# Patient Record
Sex: MALE | Race: WHITE
[De-identification: names, ages, dates, MRNs, and addresses within clinical notes are randomized per-mention and may not be internally consistent; named-entity substitution may affect disease eponyms.]

---

## 2017-04-21 ENCOUNTER — HOSPITAL ENCOUNTER (OUTPATIENT)
Dept: HOSPITAL 61 - PCVCIMAG | Age: 82
Discharge: HOME | End: 2017-04-21
Attending: INTERNAL MEDICINE
Payer: MEDICARE

## 2017-04-21 DIAGNOSIS — E78.00: ICD-10-CM

## 2017-04-21 DIAGNOSIS — I25.10: ICD-10-CM

## 2017-04-21 DIAGNOSIS — I65.23: Primary | ICD-10-CM

## 2017-04-21 DIAGNOSIS — Z83.49: ICD-10-CM

## 2017-04-21 DIAGNOSIS — Z95.1: ICD-10-CM

## 2017-04-21 DIAGNOSIS — E04.1: ICD-10-CM

## 2017-04-21 PROCEDURE — G0463 HOSPITAL OUTPT CLINIC VISIT: HCPCS

## 2017-04-21 PROCEDURE — 93880 EXTRACRANIAL BILAT STUDY: CPT

## 2017-04-21 PROCEDURE — 93005 ELECTROCARDIOGRAM TRACING: CPT

## 2017-04-21 PROCEDURE — 76536 US EXAM OF HEAD AND NECK: CPT

## 2018-01-17 ENCOUNTER — HOSPITAL ENCOUNTER (OUTPATIENT)
Dept: HOSPITAL 61 - PCVCIMAG | Age: 83
Discharge: HOME | End: 2018-01-17
Attending: INTERNAL MEDICINE
Payer: MEDICARE

## 2018-01-17 DIAGNOSIS — I10: ICD-10-CM

## 2018-01-17 DIAGNOSIS — Z95.1: ICD-10-CM

## 2018-01-17 DIAGNOSIS — I25.10: Primary | ICD-10-CM

## 2018-01-17 PROCEDURE — 93351 STRESS TTE COMPLETE: CPT

## 2018-01-17 PROCEDURE — 93325 DOPPLER ECHO COLOR FLOW MAPG: CPT

## 2018-08-14 ENCOUNTER — HOSPITAL ENCOUNTER (OUTPATIENT)
Dept: HOSPITAL 61 - PCVCCLINIC | Age: 83
Discharge: HOME | End: 2018-08-14
Attending: INTERNAL MEDICINE
Payer: MEDICARE

## 2018-08-14 DIAGNOSIS — I25.810: Primary | ICD-10-CM

## 2018-08-14 DIAGNOSIS — Z87.891: ICD-10-CM

## 2018-08-14 DIAGNOSIS — Z79.899: ICD-10-CM

## 2018-08-14 DIAGNOSIS — I77.9: ICD-10-CM

## 2018-08-14 DIAGNOSIS — Z79.82: ICD-10-CM

## 2018-08-14 DIAGNOSIS — Z95.1: ICD-10-CM

## 2018-08-14 DIAGNOSIS — E04.1: ICD-10-CM

## 2018-08-14 DIAGNOSIS — E78.00: ICD-10-CM

## 2018-08-14 DIAGNOSIS — I10: ICD-10-CM

## 2018-08-14 DIAGNOSIS — Z88.0: ICD-10-CM

## 2018-08-14 PROCEDURE — 93005 ELECTROCARDIOGRAM TRACING: CPT

## 2018-08-14 PROCEDURE — 80061 LIPID PANEL: CPT

## 2018-08-14 PROCEDURE — G0463 HOSPITAL OUTPT CLINIC VISIT: HCPCS

## 2019-02-26 ENCOUNTER — HOSPITAL ENCOUNTER (OUTPATIENT)
Dept: HOSPITAL 61 - PCVCIMAG | Age: 84
Discharge: HOME | End: 2019-02-26
Attending: INTERNAL MEDICINE
Payer: MEDICARE

## 2019-02-26 DIAGNOSIS — Z95.1: ICD-10-CM

## 2019-02-26 DIAGNOSIS — E04.2: ICD-10-CM

## 2019-02-26 DIAGNOSIS — I25.10: Primary | ICD-10-CM

## 2019-02-26 PROCEDURE — 76536 US EXAM OF HEAD AND NECK: CPT

## 2019-02-26 PROCEDURE — 93351 STRESS TTE COMPLETE: CPT

## 2019-02-26 PROCEDURE — 93325 DOPPLER ECHO COLOR FLOW MAPG: CPT

## 2019-02-26 NOTE — PCVCIMAG
EXAM: ULTRASOUND OF THE THYROID



INDICATION: Thyroid nodules.



FINDINGS: 

The right thyroid lobe measures 1.5 x 1.8 x 4.9 cm.

The left thyroid lobe measures 1.6 x 1.8 x 4.1 cm. 

2.0 x 3.9 x 4.0 cm masslike enlargement of the thyroid isthmus is

unchanged compared to April 2017 study.  Further evaluation by ENT is

suggested.

No additional thyroid nodules identified.



IMPRESSION: 

2.0 x 3.9 x 4.0 cm masslike enlargement of the thyroid isthmus is

unchanged since prior study remains indeterminate.  Further evaluation

ENT may be helpful.



LOC:OFFICE

## 2019-02-26 NOTE — PCVCIMAG
--------------- APPROVED REPORT --------------





Study performed:  02/26/2019 14:36:34



Exam:  Stress Echocardiogram

Indication: CAD s/p CABG

Patient Location: Echo lab

Stress Nurse: Naima Lane RN

Status: routine



Ht: 6 ft 0 in  

HR: 71 bpm      BP: 150/70 mmHg

Rhythm: NSR



Medical History

Medical History: CAD s/p CABG

Exercise History: Physically active



Procedure

The patient underwent an Exercise Stress Test using the Nik 

Protocol. Blood pressure, heart rate, and EKG were monitored.

An Echocardiogram was performed by technician in four stages in quad 

fashion.  At peak stress, four selected images were obtained and 

placed side by side with resting images for comparison.



Stress Test Details

Stress Test:  Exercise stress testing was performed using a Nik 

protocol.

HR

Resting HR:            71 bpmMax Heart Rate (APMHR): 136 bpm 

Max HR Achieved:  134 bpmTarget HR (85% APMHR): 115 bpm

% of APMHR:         98

Recovery HR:            87 bpm

HR response to stress: Normal HR response to stress



BP

Resting BP:  150/70 mmHg

Max BP:       170/70 mmHg

Recovery BP:       144/70 mmHg

BP response to stress: Normal blood pressure response to 

stress.

ECG

Resting ECG:  Sinus Rhythm

Stress ECG:     Sinus Rhythm

Recovery ECG: Sinus Rhythm



Clinical

Reason for Termination: Maximal effort

Exercise duration: 7 min sec

Highest Stage Achieved: Stage 3: 3.4 mph at 14% grade. 

Exercise capacity: 10.10 METs



Pre-Stress Echo

The resting Echocardiogram showed normal left ventricular 

contractility with an estimated Ejection Fraction of about >55%. 

Normal wall motion in all segments on baseline images.



Post-Stress Echo

The stress Echocardiogram showed normal left ventricular 

contractility with an estimated Ejection Fraction of about 55-60%. 

Normal augmentation of wall motion in all segments on post stress 

images.



Clinical

No clinical or ECG evidence for ischemia.



Conclusion

Clinical Response:  Non-ischemic

Exercise Capacity:  Average

Stress ECG Response:  Non-ischemic

The left ventricle is normal in size and wall thickness in both the 

rest and stress images.



Other Information

Study Quality: Technically Difficult



<Conclusion>

The left ventricle is normal in size and wall thickness in both the 

rest and stress images.

## 2019-11-05 ENCOUNTER — HOSPITAL ENCOUNTER (OUTPATIENT)
Dept: HOSPITAL 61 - PCVCCLINIC | Age: 84
Discharge: HOME | End: 2019-11-05
Attending: INTERNAL MEDICINE
Payer: MEDICARE

## 2019-11-05 DIAGNOSIS — Z79.82: ICD-10-CM

## 2019-11-05 DIAGNOSIS — Z79.899: ICD-10-CM

## 2019-11-05 DIAGNOSIS — I25.10: Primary | ICD-10-CM

## 2019-11-05 DIAGNOSIS — Z86.39: ICD-10-CM

## 2019-11-05 DIAGNOSIS — I45.10: ICD-10-CM

## 2019-11-05 DIAGNOSIS — Z95.1: ICD-10-CM

## 2019-11-05 DIAGNOSIS — E78.00: ICD-10-CM

## 2019-11-05 DIAGNOSIS — Z72.89: ICD-10-CM

## 2019-11-05 DIAGNOSIS — Z88.0: ICD-10-CM

## 2019-11-05 DIAGNOSIS — R94.31: ICD-10-CM

## 2019-11-05 DIAGNOSIS — I65.23: ICD-10-CM

## 2019-11-05 DIAGNOSIS — I10: ICD-10-CM

## 2019-11-05 DIAGNOSIS — Z87.891: ICD-10-CM

## 2019-11-05 PROCEDURE — 36415 COLL VENOUS BLD VENIPUNCTURE: CPT

## 2019-11-05 PROCEDURE — G0463 HOSPITAL OUTPT CLINIC VISIT: HCPCS

## 2019-11-05 PROCEDURE — 93005 ELECTROCARDIOGRAM TRACING: CPT

## 2019-11-05 PROCEDURE — 80061 LIPID PANEL: CPT
